# Patient Record
Sex: MALE | Race: WHITE | NOT HISPANIC OR LATINO | Employment: UNEMPLOYED | URBAN - METROPOLITAN AREA
[De-identification: names, ages, dates, MRNs, and addresses within clinical notes are randomized per-mention and may not be internally consistent; named-entity substitution may affect disease eponyms.]

---

## 2019-07-31 ENCOUNTER — HOSPITAL ENCOUNTER (OUTPATIENT)
Dept: RADIOLOGY | Facility: MEDICAL CENTER | Age: 39
End: 2019-07-31

## 2019-08-06 ENCOUNTER — HOSPITAL ENCOUNTER (OUTPATIENT)
Dept: RADIOLOGY | Facility: MEDICAL CENTER | Age: 39
End: 2019-08-06
Attending: FAMILY MEDICINE
Payer: COMMERCIAL

## 2019-08-06 DIAGNOSIS — E07.9 THYROID LUMP: ICD-10-CM

## 2019-08-06 LAB — CYTOLOGY REG CYTOL: NORMAL

## 2019-08-06 PROCEDURE — 10007 FNA BX W/FLUOR GDN 1ST LES: CPT

## 2019-08-06 PROCEDURE — 88112 CYTOPATH CELL ENHANCE TECH: CPT

## 2019-08-06 NOTE — PROGRESS NOTES
"Patient given [facility] \"Preventing the Spread of Infection\" brochure upon arrival.     US guided right thyroid nodule fine needle aspiration done by Dr. Hewitt; NON-SEDATION  (no H&P required as this is a NON SEDATION procedure) Right anterior aspect of neck access site; 1 jar of cytolyt obtained and sent to pathology lab; pt tolerated the procedure well; pt hemodynamically stable pre/intra/post procedure; all questions and concerns answered prior to being d/c; patient provided with appropriate education for procedure; pt d/c home.     "